# Patient Record
Sex: FEMALE | Race: WHITE | NOT HISPANIC OR LATINO | URBAN - METROPOLITAN AREA
[De-identification: names, ages, dates, MRNs, and addresses within clinical notes are randomized per-mention and may not be internally consistent; named-entity substitution may affect disease eponyms.]

---
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
## 2021-01-19 ENCOUNTER — OFFICE VISIT (OUTPATIENT)
Dept: URBAN - METROPOLITAN AREA CLINIC 40 | Facility: CLINIC | Age: 21
End: 2021-01-19

## 2021-01-31 ENCOUNTER — ERX REFILL RESPONSE (OUTPATIENT)
Age: 21
End: 2021-01-31

## 2021-01-31 RX ORDER — OMEPRAZOLE 40 MG/1
TAKE 1 CAPSULE BY MOUTH DAILY CAPSULE, DELAYED RELEASE ORAL
Qty: 30 | Refills: 0

## 2021-03-09 ENCOUNTER — OFFICE VISIT (OUTPATIENT)
Dept: URBAN - METROPOLITAN AREA CLINIC 40 | Facility: CLINIC | Age: 21
End: 2021-03-09
Payer: COMMERCIAL

## 2021-03-09 DIAGNOSIS — E55.9 VITAMIN D DEFICIENCY: ICD-10-CM

## 2021-03-09 DIAGNOSIS — K21.9 GERD: ICD-10-CM

## 2021-03-09 DIAGNOSIS — Z79.899 ENCOUNTER FOR LONG-TERM (CURRENT) DRUG USE: ICD-10-CM

## 2021-03-09 PROBLEM — 34713006 VITAMIN D DEFICIENCY: Status: ACTIVE | Noted: 2021-03-09

## 2021-03-09 PROCEDURE — 99214 OFFICE O/P EST MOD 30 MIN: CPT | Performed by: INTERNAL MEDICINE

## 2021-03-09 RX ORDER — OMEPRAZOLE 40 MG/1
TAKE 1 CAPSULE BY MOUTH DAILY CAPSULE, DELAYED RELEASE ORAL
Qty: 30 | Refills: 0 | COMMUNITY

## 2021-03-09 RX ORDER — OMEPRAZOLE 40 MG/1
1 TABLET 30 MINUTES BEFORE MORNING MEAL CAPSULE, DELAYED RELEASE ORAL ONCE A DAY
Qty: 90 | Refills: 1

## 2021-03-09 NOTE — HPI-TODAY'S VISIT:
Ms. Ravi presents to clinic for follow-up.  She was last seen July 21, 2020.  We are following her for probable reflux.  She has been offered EGD in the past and has declined.  At her last appointment we did lab work which found her vitamin D to be slightly low at 26.6.  She continues on omeprazole 40 mg.  She actually has lost 40 pounds intentionally since her last visit.  She tried to come all the way off the medication and states 2 to 3 weeks off and she will started to have episodes of periumbilical severe abdominal pain.  There is no nausea or dysphagia.  She denies taking any NSAIDs.  She is moving her bowels regularly daily.  There is no blood in the stool.

## 2021-03-10 LAB
BUN/CREATININE RATIO: 16
BUN: 15
CARBON DIOXIDE, TOTAL: 24
CHLORIDE: 105
CREATININE: 0.96
EGFR IF AFRICN AM: 99
EGFR IF NONAFRICN AM: 86
GLUCOSE: 94
MAGNESIUM: 2.2
POTASSIUM: 4.6
SODIUM: 142
VITAMIN B12: 258
VITAMIN D, 25-HYDROXY: 29.4

## 2021-08-25 ENCOUNTER — DASHBOARD ENCOUNTERS (OUTPATIENT)
Age: 21
End: 2021-08-25

## 2021-09-07 ENCOUNTER — TELEPHONE ENCOUNTER (OUTPATIENT)
Dept: URBAN - METROPOLITAN AREA CLINIC 40 | Facility: CLINIC | Age: 21
End: 2021-09-07

## 2021-09-07 ENCOUNTER — OFFICE VISIT (OUTPATIENT)
Dept: URBAN - METROPOLITAN AREA CLINIC 40 | Facility: CLINIC | Age: 21
End: 2021-09-07

## 2021-09-07 RX ORDER — OMEPRAZOLE 40 MG/1
1 TABLET 30 MINUTES BEFORE MORNING MEAL CAPSULE, DELAYED RELEASE ORAL ONCE A DAY
Qty: 90 | Refills: 1 | Status: ACTIVE | COMMUNITY